# Patient Record
Sex: MALE | Race: OTHER | HISPANIC OR LATINO | URBAN - METROPOLITAN AREA
[De-identification: names, ages, dates, MRNs, and addresses within clinical notes are randomized per-mention and may not be internally consistent; named-entity substitution may affect disease eponyms.]

---

## 2020-08-05 ENCOUNTER — EMERGENCY (EMERGENCY)
Facility: HOSPITAL | Age: 25
LOS: 0 days | Discharge: HOME | End: 2020-08-05
Attending: EMERGENCY MEDICINE | Admitting: EMERGENCY MEDICINE
Payer: MEDICAID

## 2020-08-05 VITALS
TEMPERATURE: 99 F | OXYGEN SATURATION: 99 % | HEART RATE: 68 BPM | DIASTOLIC BLOOD PRESSURE: 75 MMHG | WEIGHT: 154.98 LBS | RESPIRATION RATE: 18 BRPM | SYSTOLIC BLOOD PRESSURE: 134 MMHG

## 2020-08-05 DIAGNOSIS — R42 DIZZINESS AND GIDDINESS: ICD-10-CM

## 2020-08-05 PROCEDURE — 99284 EMERGENCY DEPT VISIT MOD MDM: CPT

## 2020-08-05 NOTE — ED PROVIDER NOTE - ATTENDING CONTRIBUTION TO CARE
24M no pmh p/w lightheadedness x 5 days. Works @ [employer], started after he bumped his head into a shelf 5d prior. No loc. Intermittent lightheadedness, no assox sx, no specific aggrav/allev factors. No ha, blurry vision, neck pain, cp/sob, nv, abd pain, focal numbness or weakness.    PE:  nad  skin warm, dry  ncat  neck supple  rrr nl s1s2 no mrg  ctab no wrr  abd soft ntnd no palpable masses no rgr  back non-tender no cvat  ext no cce dpi  neuro aaox3, cn 2-12 intact bl no nystagmus or facial droop, 5/5 strength x 4 no drift, gross sensation intact, finger-nose nl, gait nl, romberg neg

## 2020-08-05 NOTE — ED PROVIDER NOTE - PATIENT PORTAL LINK FT
You can access the FollowMyHealth Patient Portal offered by [facility] by registering at the following website: [email or web address]. By joining [employer]’s FollowMyHealth portal, you will also be able to view your health information using other applications (apps) compatible with our system.

## 2020-08-05 NOTE — ED PROVIDER NOTE - PHYSICAL EXAMINATION
PHYSICAL EXAM:    GENERAL: Alert, appears stated age, well appearing, non-toxic  SKIN: Warm, pink and dry. MMM.   HEAD: NC, AT, no step offs   EYE: Normal lids/conjunctiva, PERRL, EOMI  ENT: Normal hearing, patent oropharynx  NECK: +supple. No meningismus, or JVD, +Trachea midline. no tenderness/step offs.   Pulm: Bilateral BS, normal resp effort, no wheezes, stridor, or retractions  CV: RRR, no M/R/G, 2+and = radial pulses  Abd: soft, non-tender, non-distended  Mskel: no erythema, cyanosis, edema. no calf tenderness  Neuro: AAOx3, no sensory/motor deficits, CN 2-12 intact. No speech slurring, pronator drift, facial asymmetry. normal finger-to-nose b/l. 5/5 strength throughout. normal gait. negative romberg.

## 2020-08-05 NOTE — ED ADULT TRIAGE NOTE - CHIEF COMPLAINT QUOTE
pt states he is feeling dizzy/ light headed / has pain to the left side of his head. on Thursday pt hit his head on a metal rack at work. states the pain to the left side of his head started after he hit his head. pt was at work today and was sent here for c/o dizziness

## 2020-08-05 NOTE — ED PROVIDER NOTE - CLINICAL SUMMARY MEDICAL DECISION MAKING FREE TEXT BOX
lightheadedness s/p minor head trauma 5d ago - neuro exam nf - return precautions discussed, outpt concussion program f/u

## 2020-08-05 NOTE — ED ADULT NURSE NOTE - OBJECTIVE STATEMENT
patient complaining of headache and dizziness staring Thursday 10am. Patient hit his head on the metal rack at work bending forward to lift a box. patient had blurred vision momentarily right after hit his head. patient has left side forehead pain that is radiating to back of his neck. denies LOC and any n/v/d. patient stated that he drank some ban yesterday. marijuana user.  A&O x3, VSS.

## 2020-08-05 NOTE — ED PROVIDER NOTE - OBJECTIVE STATEMENT
23 y/o M without PMH presents with mild intermittent lightheadedness x today in the setting of head injury 5 days ago. no current symptoms.   no palliating/provoking factors. no hx prior.   no LOC/recurrent trauma/n/v/fall.

## 2020-08-05 NOTE — ED PROVIDER NOTE - NS ED ROS FT
Review of Systems    Constitutional: (-) fever   Eyes/ENT: (-) vision changes  Cardiovascular: (-) chest pain, (-) syncope (-) palpitations  Respiratory: (-) cough, (-) shortness of breath  Gastrointestinal: (-) vomiting, (-) diarrhea (-) abdominal pain  Genitourinary:  (-) dysuria   Musculoskeletal: (-) neck pain, (-) back pain, (-) leg pain/swelling  Integumentary: (-) rash, (-) edema  Neurological: (-) headache, but does have mild L forehead skin pain from injury.  (-) confusion  Hematologic: (-) easy bruising   Psychiatric: (-) hallucinations  Allergic/Immunologic: (-) pruritus

## 2020-08-05 NOTE — ED PROVIDER NOTE - NSFOLLOWUPCLINICS_GEN_ALL_ED_FT
A Family Medicine Doctor  Family Medicine  .  [place]   Phone:   Fax:   Follow Up Time: 1-3 Days    [facility] Concussion Program  Concussion Program  [address] [place] [address]   Phone: [phone]  Fax:   Follow Up Time: 1-3 Days

## 2020-08-18 ENCOUNTER — EMERGENCY (EMERGENCY)
Facility: HOSPITAL | Age: 25
LOS: 0 days | Discharge: HOME | End: 2020-08-18
Attending: EMERGENCY MEDICINE | Admitting: EMERGENCY MEDICINE
Payer: OTHER MISCELLANEOUS

## 2020-08-18 VITALS
DIASTOLIC BLOOD PRESSURE: 78 MMHG | RESPIRATION RATE: 17 BRPM | OXYGEN SATURATION: 97 % | SYSTOLIC BLOOD PRESSURE: 119 MMHG | HEART RATE: 72 BPM | TEMPERATURE: 99 F
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
## 2020-08-18 DIAGNOSIS — R51 HEADACHE: ICD-10-CM
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
## 2020-08-18 PROCEDURE — 99283 EMERGENCY DEPT VISIT LOW MDM: CPT

## 2020-08-18 NOTE — ED PROVIDER NOTE - OBJECTIVE STATEMENT
24 year old M with no pmhx requesting work note. Pt was seen on ED 08/05 after he walked into metal shelf and hit L side of forehead. Sts has been asymptomatic since. Pt had mild headache this morning so did not go to work. Sts headache has since resolved without any intervention. Denies any dizziness, nausea, vomiting, neck pain, visual changes, fever/chills, weakness/fatigue.

## 2020-08-18 NOTE — ED PROVIDER NOTE - PHYSICAL EXAMINATION
CONSTITUTIONAL: Well-appearing; well-nourished; in no apparent distress.   NECK: Supple; non-tender; no cervical lymphadenopathy.    CARDIOVASCULAR: Normal S1, S2; no murmurs, rubs, or gallops.   RESPIRATORY: Normal chest excursion with respiration; breath sounds clear and equal bilaterally; no wheezes, rhonchi, or rales.  GI/: Normal bowel sounds; non-distended; non-tender; no palpable organomegaly.   MS: No evidence of trauma or deformity. Normal ROM in all four extremities; non-tender to palpation; distal pulses are normal.   SKIN: Normal for age and race; warm; dry; good turgor; no apparent lesions or exudate.   NEURO/PSYCH: A & O x 4; grossly unremarkable. mood and manner are appropriate. No focal deficits.

## 2020-08-18 NOTE — ED PROVIDER NOTE - CLINICAL SUMMARY MEDICAL DECISION MAKING FREE TEXT BOX
pt with mild headache requesting worknote. pt advised otc meds and f/u as outpt if headache not improved

## 2020-08-18 NOTE — ED PROVIDER NOTE - NS ED ROS FT
Constitutional: no fever, chills, no recent weight loss, change in appetite or malaise  Eyes: no redness/discharge/pain/vision changes  Cardiac: No chest pain, SOB or edema.  Respiratory: No cough or respiratory distress  GI: No nausea, vomiting, diarrhea or abdominal pain.  MS: no pain to back or extremities, no loss of ROM, no weakness  Neuro: + mild headache this morning. No LOC.  Skin: No skin rash.  Endocrine: No history of thyroid disease or diabetes.  Except as documented in the HPI, all other systems are negative.

## 2020-08-18 NOTE — ED PROVIDER NOTE - PATIENT PORTAL LINK FT
You can access the FollowMyHealth Patient Portal offered by [facility] by registering at the following website: [email or web address]. By joining [employer]’s FollowMyHealth portal, you will also be able to view your health information using other applications (apps) compatible with our system.

## 2020-08-18 NOTE — ED PROVIDER NOTE - PROGRESS NOTE DETAILS
Attending Note: 25 y/o M with no significant PMH, presents to ED for evaluation of headache. Pt reports in late July he hit his head against an oversize bin while at work. Pt was seen in ED on 8/5 for headache and was given a work note. Pt says he took a week off work and his headache improved however when he started work again today he feels the sx have returned. No vision loss, neck pain, weakness or numbness.  PE: CON: ao x 3, HENMT: clear oropharynx,  neck supple,  CV: rrr, equal pulses b/l, RESP: cta b/l, GI:  soft, nontender, no rebound, no guarding, SKIN: no rash, MSK: no deformities, NEURO: no gross motor or sensory deficit Psychiatric: appropriate mood, appropriate affect  Impression: Mild headache. Advised to take over the counter medication and if persists to f/u outpatient. Also provided pt with a work note

## 2020-08-18 NOTE — ED ADULT TRIAGE NOTE - CHIEF COMPLAINT QUOTE
Pt states he had a head injury 7/27th while working that he hit his forehead on shelf, pt was seen in ED and cleared to go back to work 8/12, pt endorses returning to work this past week and having a headache, pt requesting work note for missing work today.

## 2020-08-29 ENCOUNTER — EMERGENCY (EMERGENCY)
Facility: HOSPITAL | Age: 25
LOS: 0 days | Discharge: HOME | End: 2020-08-29
Attending: EMERGENCY MEDICINE | Admitting: EMERGENCY MEDICINE
Payer: MEDICAID

## 2020-08-29 VITALS
DIASTOLIC BLOOD PRESSURE: 58 MMHG | HEART RATE: 103 BPM | RESPIRATION RATE: 17 BRPM | TEMPERATURE: 99 F | SYSTOLIC BLOOD PRESSURE: 121 MMHG | OXYGEN SATURATION: 98 %

## 2020-08-29 DIAGNOSIS — R51 HEADACHE: ICD-10-CM

## 2020-08-29 PROBLEM — Z78.9 OTHER SPECIFIED HEALTH STATUS: Chronic | Status: ACTIVE | Noted: 2020-08-18

## 2020-08-29 LAB
ANION GAP SERPL CALC-SCNC: 11 MMOL/L — SIGNIFICANT CHANGE UP (ref 7–14)
BUN SERPL-MCNC: 12 MG/DL — SIGNIFICANT CHANGE UP (ref 10–20)
CALCIUM SERPL-MCNC: 9.7 MG/DL — SIGNIFICANT CHANGE UP (ref 8.5–10.1)
CHLORIDE SERPL-SCNC: 104 MMOL/L — SIGNIFICANT CHANGE UP (ref 98–110)
CO2 SERPL-SCNC: 26 MMOL/L — SIGNIFICANT CHANGE UP (ref 17–32)
CREAT SERPL-MCNC: 1.2 MG/DL — SIGNIFICANT CHANGE UP (ref 0.7–1.5)
GLUCOSE SERPL-MCNC: 68 MG/DL — LOW (ref 70–99)
HCT VFR BLD CALC: 44.9 % — SIGNIFICANT CHANGE UP (ref 42–52)
HGB BLD-MCNC: 14.9 G/DL — SIGNIFICANT CHANGE UP (ref 14–18)
MCHC RBC-ENTMCNC: 30.3 PG — SIGNIFICANT CHANGE UP (ref 27–31)
MCHC RBC-ENTMCNC: 33.2 G/DL — SIGNIFICANT CHANGE UP (ref 32–37)
MCV RBC AUTO: 91.4 FL — SIGNIFICANT CHANGE UP (ref 80–94)
NRBC # BLD: 0 /100 WBCS — SIGNIFICANT CHANGE UP (ref 0–0)
PLATELET # BLD AUTO: 274 K/UL — SIGNIFICANT CHANGE UP (ref 130–400)
POTASSIUM SERPL-MCNC: 4.1 MMOL/L — SIGNIFICANT CHANGE UP (ref 3.5–5)
POTASSIUM SERPL-SCNC: 4.1 MMOL/L — SIGNIFICANT CHANGE UP (ref 3.5–5)
RBC # BLD: 4.91 M/UL — SIGNIFICANT CHANGE UP (ref 4.7–6.1)
RBC # FLD: 12.8 % — SIGNIFICANT CHANGE UP (ref 11.5–14.5)
SODIUM SERPL-SCNC: 141 MMOL/L — SIGNIFICANT CHANGE UP (ref 135–146)
WBC # BLD: 8.22 K/UL — SIGNIFICANT CHANGE UP (ref 4.8–10.8)
WBC # FLD AUTO: 8.22 K/UL — SIGNIFICANT CHANGE UP (ref 4.8–10.8)

## 2020-08-29 PROCEDURE — 99284 EMERGENCY DEPT VISIT MOD MDM: CPT

## 2020-08-29 RX ORDER — MECLIZINE HCL 12.5 MG
50 TABLET ORAL ONCE
Refills: 0 | Status: DISCONTINUED | OUTPATIENT
Start: 2020-08-29 | End: 2020-08-29

## 2020-08-29 RX ORDER — METOCLOPRAMIDE HCL 10 MG
10 TABLET ORAL ONCE
Refills: 0 | Status: DISCONTINUED | OUTPATIENT
Start: 2020-08-29 | End: 2020-08-29

## 2020-08-29 RX ORDER — SODIUM CHLORIDE 9 MG/ML
1000 INJECTION INTRAMUSCULAR; INTRAVENOUS; SUBCUTANEOUS ONCE
Refills: 0 | Status: DISCONTINUED | OUTPATIENT
Start: 2020-08-29 | End: 2020-08-29

## 2020-08-29 RX ORDER — DIPHENHYDRAMINE HCL 50 MG
25 CAPSULE ORAL ONCE
Refills: 0 | Status: DISCONTINUED | OUTPATIENT
Start: 2020-08-29 | End: 2020-08-29

## 2020-08-29 NOTE — ED PROVIDER NOTE - PROGRESS NOTE DETAILS
pt appears nontoxic, walking in and out of ED.  now seen searching for previous ED provider asking for 1 wk work note from previous encounter

## 2020-08-29 NOTE — ED PROVIDER NOTE - NS ED ROS FT
Review of Systems:  	•	CONSTITUTIONAL - no fever, no diaphoresis, no weight change  	•	SKIN - no rash  	•	HEMATOLOGIC - no bleeding, no bruising  	•	EYES - no eye pain, no blurred vision  	•	ENT - no change in hearing, no pain  	•	RESPIRATORY - no shortness of breath, no cough  	•	CARDIAC - no chest pain, no palpitations  	•	GI - no abd pain, no nausea, no vomiting, no diarrhea, no constipation, no bleeding  	•	 - no dysuria, no hematuria, no flank pain, no urinary retention  	•	MUSCULOSKELETAL - no joint paint, no swelling, no redness  	•	NEUROLOGIC - no weakness, + headache, no paresthesias, no dizziness  All other systems negative, unless specified in HPI

## 2020-08-29 NOTE — ED PROVIDER NOTE - CARE PROVIDER_API CALL
Lisa Barrow  REHAB IP PROF-OFFICE STAFF  [address] [place] [address]  Phone: [phone]  Fax: [phone]  Follow Up Time: 1-3 Days

## 2020-08-29 NOTE — ED ADULT NURSE NOTE - NSIMPLEMENTINTERV_GEN_ALL_ED
Implemented All Universal Safety Interventions:  [place] to call system. Call bell, personal items and telephone within reach. Instruct patient to call for assistance. Room bathroom lighting operational. Non-slip footwear when patient is off stretcher. Physically safe environment: no spills, clutter or unnecessary equipment. Stretcher in lowest position, wheels locked, appropriate side rails in place.

## 2020-08-29 NOTE — ED PROVIDER NOTE - OBJECTIVE STATEMENT
25 yo m with no pmh, L forehead trauma last month, presents for work leave note.  hit his head on corner of counter while bending.  pt had no loc.  pt says was feeling fine for a few days, but started having headaches.  pt came to ED and was cleared x 2.  pt's 2nd visit was for a work note.  today, pt says he needs a work note from last week to next week.  pt says he was able to get a work note x 1 week the first time he was here.  pt was advised that will only be giving him 1-2 days max work note from ED.  offered assessment of his headache, refused meds, ct, ivf, but agreed to blood work.  pt says he doesn't like taking meds and has not f/u with any of the referrals.

## 2020-08-29 NOTE — ED PROVIDER NOTE - CLINICAL SUMMARY MEDICAL DECISION MAKING FREE TEXT BOX
Pt with head trauma last month, c/o intermittent headache, refuses complete w/u, only agreed to labs, refused meds.  pt appears noncompliant.  pt is here asking for a  2 week work note.  Advised, unable to give, but can give 2 days

## 2020-08-29 NOTE — ED PROVIDER NOTE - PATIENT PORTAL LINK FT
You can access the FollowMyHealth Patient Portal offered by [facility] by registering at the following website: [email or web address]. By joining [employer]’s FollowMyHealth portal, you will also be able to view your health information using other applications (apps) compatible with our system.

## 2020-08-29 NOTE — ED ADULT TRIAGE NOTE - CHIEF COMPLAINT QUOTE
Pt complaining of headache and dizziness x 1 week. Pt had head injury last month. denies LOC. denies blood thinners

## 2020-09-06 ENCOUNTER — EMERGENCY (EMERGENCY)
Facility: HOSPITAL | Age: 25
LOS: 0 days | Discharge: HOME | End: 2020-09-06
Attending: EMERGENCY MEDICINE | Admitting: EMERGENCY MEDICINE
Payer: MEDICAID

## 2020-09-06 VITALS
HEART RATE: 76 BPM | RESPIRATION RATE: 18 BRPM | TEMPERATURE: 98 F | SYSTOLIC BLOOD PRESSURE: 118 MMHG | DIASTOLIC BLOOD PRESSURE: 73 MMHG | OXYGEN SATURATION: 99 %

## 2020-09-06 DIAGNOSIS — R51 HEADACHE: ICD-10-CM

## 2020-09-06 DIAGNOSIS — F17.200 NICOTINE DEPENDENCE, UNSPECIFIED, UNCOMPLICATED: ICD-10-CM

## 2020-09-06 PROCEDURE — 99283 EMERGENCY DEPT VISIT LOW MDM: CPT

## 2020-09-06 NOTE — ED PROVIDER NOTE - PATIENT PORTAL LINK FT
You can access the FollowMyHealth Patient Portal offered by [facility] by registering at the following website: [email or web address]. By joining [employer]’s FollowMyHealth portal, you will also be able to view your health information using other applications (apps) compatible with our system.

## 2020-09-06 NOTE — ED PROVIDER NOTE - ATTENDING CONTRIBUTION TO CARE
24M no pmh, p/w request for work note. states sustained CHI at work on 7/31. has been having intermittent headaches since. mild throbbing diffuse nonradiating. went back to work and felt like his HAs were getting worse so came today for work note for a couple days to stay home and rest. no numbness, weakness, tingling. no loc, n/v. no blood thinners. no blurry vision, slurred speech, trouble walking. no fever, neck stiffness, ams. no cp, sob, abd pain, nvdc. no dysuria, freq, hematuria. no cough. has not made appt w concussion specialist.     on exam, AFVSS, well kaleigh nad, ncat, eomi, perrla, mmm, lctab, rrr nl s1s2 no mrg, abd soft ntnd, aaox3, CN 2-12 intact, No nystagmus.  5/5 motor x 4 ext, SILT x 4 extremities, No facial droop or slurred speech. No pronator drift.  Normal rapid alternating movement and finger nose finger bilaterally. No midline C/T/L tenderness to palpation or step off. Normal gait, No ataxia.  no le edema or calf ttp,     a/p; HA no redflags, asymptomatic in ED, NV intact. Will give work note for 2 days, f/u concussion specialist 1-2 weeks for further work up, strict return precautions provided.

## 2020-09-06 NOTE — ED PROVIDER NOTE - CLINICAL SUMMARY MEDICAL DECISION MAKING FREE TEXT BOX
a/p; HA no redflags, asymptomatic in ED, NV intact. Will give work note for 2 days, f/u concussion specialist 1-2 weeks for further work up, strict return precautions provided.

## 2020-09-06 NOTE — ED PROVIDER NOTE - NS ED ROS FT
CONSTITUTIONAL: (-) fevers, (-) chills  EYES: (-) vision changes, (-) blurry vision, (-) double vision  ENT: (-) tinnitus, (-) congestion, (-) rhinorrhea  NECK: (-) neck pain, (-) neck stiffness  CARDIO: (-) chest pain, (-) palpitations  PULM: (-) cough, (-) sputum, (-) chest tightness, (-) shortness of breath  GI: (-) nausea, (-) vomiting, (-) abdominal pain  MSK: (-) back pain, (-) gait difficulty  SKIN: (-) rashes, (-) pallor  NEURO: see HPI,  (-) dizziness, (-) lightheadedness,  (-) weakness, (-) paresthesias, (-) numbness, (-) syncope, (-) speech changes, (-) facial droop, (-) confusion, (-) seizures    *all other systems negative except as documented above and in the HPI*

## 2020-09-06 NOTE — ED PROVIDER NOTE - PHYSICAL EXAMINATION
VITALS:  I have reviewed the initial vital signs.  GENERAL: Well-developed, well-nourished, in no acute distress. Nontoxic.  HEENT: Sclera clear. EOMI, PERRLA. Mucous membranes moist.  NECK: supple w FROM.   CARDIO: RRR, nl S1 and S2. No murmurs, rubs, or gallops.   PULM: Normal effort. CTA b/l without wheezes, rales, or rhonchi.  MSK: Normal, steady gait.  SKIN: Warm, dry. Capillary refill <2 seconds. No pallor. No rash.  NEURO: A&Ox3. Speech clear. CN II-XII intact. 5/5 strength to upper and lower extremities b/l. Sensation intact and equal throughout. No pronator drift. Finger to nose intact. Normal heel to shin. Negative romberg.  PSYCH: Calm and cooperative.

## 2020-09-06 NOTE — ED PROVIDER NOTE - OBJECTIVE STATEMENT
24 year old male w no pmhx presents to the ED requesting a work note. Pt states over 1 month ago he had a head injury at work, was seen in the ED but denies labs/ct head/pain medication. He tried to return to work last week but noted headaches at work, as well as decreased concentration ability. Denies additional injury, LOC, syncope, dizziness, vision changes, n/v, AC/AP use.

## 2020-09-06 NOTE — ED PROVIDER NOTE - CARE PROVIDER_API CALL
Lisa Barrow  REHAB IP PROF-OFFICE STAFF  [address] [place] [address]  Phone: [phone]  Fax: [phone]  Follow Up Time: 1-3 Days

## 2021-04-19 ENCOUNTER — APPOINTMENT (OUTPATIENT)
Dept: CARDIOLOGY | Facility: CLINIC | Age: 26
End: 2021-04-19
Payer: MEDICAID

## 2021-04-19 VITALS
SYSTOLIC BLOOD PRESSURE: 100 MMHG | HEART RATE: 58 BPM | TEMPERATURE: 97.9 F | HEIGHT: 70 IN | WEIGHT: 157 LBS | BODY MASS INDEX: 22.48 KG/M2 | DIASTOLIC BLOOD PRESSURE: 60 MMHG

## 2021-04-19 PROBLEM — Z00.00 ENCOUNTER FOR PREVENTIVE HEALTH EXAMINATION: Status: ACTIVE | Noted: 2021-04-19

## 2021-04-19 PROCEDURE — 99072 ADDL SUPL MATRL&STAF TM PHE: CPT

## 2021-04-19 PROCEDURE — 93000 ELECTROCARDIOGRAM COMPLETE: CPT

## 2021-04-19 PROCEDURE — 99203 OFFICE O/P NEW LOW 30 MIN: CPT

## 2021-04-20 ENCOUNTER — RESULT CHARGE (OUTPATIENT)
Age: 26
End: 2021-04-20

## 2021-04-27 ENCOUNTER — APPOINTMENT (OUTPATIENT)
Dept: CARDIOLOGY | Facility: CLINIC | Age: 26
End: 2021-04-27
Payer: MEDICAID

## 2021-04-27 ENCOUNTER — RESULT REVIEW (OUTPATIENT)
Age: 26
End: 2021-04-27

## 2021-04-27 ENCOUNTER — OUTPATIENT (OUTPATIENT)
Dept: OUTPATIENT SERVICES | Facility: HOSPITAL | Age: 26
LOS: 1 days | Discharge: HOME | End: 2021-04-27
Payer: MEDICAID

## 2021-04-27 VITALS
WEIGHT: 157 LBS | DIASTOLIC BLOOD PRESSURE: 70 MMHG | TEMPERATURE: 98.2 F | HEIGHT: 70 IN | SYSTOLIC BLOOD PRESSURE: 110 MMHG | BODY MASS INDEX: 22.48 KG/M2

## 2021-04-27 DIAGNOSIS — R94.31 ABNORMAL ELECTROCARDIOGRAM [ECG] [EKG]: ICD-10-CM

## 2021-04-27 DIAGNOSIS — R06.00 DYSPNEA, UNSPECIFIED: ICD-10-CM

## 2021-04-27 DIAGNOSIS — R07.9 CHEST PAIN, UNSPECIFIED: ICD-10-CM

## 2021-04-27 PROCEDURE — 71046 X-RAY EXAM CHEST 2 VIEWS: CPT | Mod: 26

## 2021-04-27 PROCEDURE — 93306 TTE W/DOPPLER COMPLETE: CPT

## 2021-04-27 PROCEDURE — 99072 ADDL SUPL MATRL&STAF TM PHE: CPT

## 2021-04-27 PROCEDURE — 99213 OFFICE O/P EST LOW 20 MIN: CPT

## 2021-04-29 PROBLEM — R06.00 DYSPNEA, UNSPECIFIED TYPE: Status: ACTIVE | Noted: 2021-04-20

## 2021-04-29 PROBLEM — R07.9 CHEST PAIN, UNSPECIFIED TYPE: Status: ACTIVE | Noted: 2021-04-20

## 2021-04-29 PROBLEM — R94.31 ABNORMAL EKG: Status: ACTIVE | Noted: 2021-04-20

## 2021-05-11 ENCOUNTER — APPOINTMENT (OUTPATIENT)
Age: 26
End: 2021-05-11

## 2021-09-17 ENCOUNTER — APPOINTMENT (OUTPATIENT)
Dept: PULMONOLOGY | Facility: CLINIC | Age: 26
End: 2021-09-17

## 2021-11-30 NOTE — ED ADULT NURSE NOTE - CAS TRG GENERAL AIRWAY, MLM
Okay, let's start back on the Topamax Topamax: 25mg twice daily.  I see that he is following up with me in clinic in a few weeks so that should be good timing to see how he responds.    Prescription sent to [facility] in [place].    Thank you,  Dr. Miranda   Patent

## 2023-04-10 NOTE — ED ADULT TRIAGE NOTE - NS ED NURSE BANDS TYPE
Problem: Discharge Planning  Goal: Discharge to home or other facility with appropriate resources  4/10/2023 1154 by Areli Nguyen RN  Outcome: Progressing  Flowsheets  Taken 4/10/2023 1151 by Areli Nguyen RN  Discharge to home or other facility with appropriate resources:   Identify barriers to discharge with patient and caregiver   Identify discharge learning needs (meds, wound care, etc)   Arrange for needed discharge resources and transportation as appropriate   Arrange for interpreters to assist at discharge as needed   Refer to discharge planning if patient needs post-hospital services based on physician order or complex needs related to functional status, cognitive ability or social support system  Taken 4/10/2023 0525 by Junito Wetzel RN  Discharge to home or other facility with appropriate resources:   Identify barriers to discharge with patient and caregiver   Arrange for needed discharge resources and transportation as appropriate   Identify discharge learning needs (meds, wound care, etc)  4/10/2023 0437 by Junito Wetzel RN  Outcome: Progressing  Flowsheets (Taken 4/10/2023 0436)  Discharge to home or other facility with appropriate resources:   Identify barriers to discharge with patient and caregiver   Arrange for needed discharge resources and transportation as appropriate   Identify discharge learning needs (meds, wound care, etc)   Arrange for interpreters to assist at discharge as needed   Refer to discharge planning if patient needs post-hospital services based on physician order or complex needs related to functional status, cognitive ability or social support system     Problem: Safety - Adult  Goal: Free from fall injury  4/10/2023 1154 by Areli Nguyen RN  Outcome: Progressing  4/10/2023 0437 by Junito Wetzel RN  Outcome: Progressing     Problem: ABCDS Injury Assessment  Goal: Absence of physical injury  4/10/2023 1154 by Areli Nguyen RN  Outcome: Progressing  4/10/2023 0437 by Name band;

## 2024-05-19 NOTE — ED PROVIDER NOTE - HIV OFFER
Previously Declined (within the last year) no back pain/no joint pain/no neck pain/no calf pain/no limited range of motion